# Patient Record
(demographics unavailable — no encounter records)

---

## 2025-04-02 NOTE — HISTORY OF PRESENT ILLNESS
[No Personal or Family History of Easy Bruising, Bleeding, or Issues with General Anesthesia] : No Personal or Family History of easy bruising, bleeding, or issues with general anesthesia [de-identified] : 8 y/o M here for follow up visit for cough and facial swelling. History of cough and facial swelling x 2 years. States no fluctuation in swelling from on each cheek currently. Denies nasal congestion Patient denies dysphagia, odynophagia, throat pain, dyspnea, dysphonia, pain while speaking, sob when speaking, hemoptysis, neck swelling or throat infections Intermittent snoring + mouth breathing; heavy breathing Restless sleeper with occasional waking at night Denies enuresis Daytime fatigue. MR orbit, face and/or neck 3/26/2025- enlarged parotid glands diffusely enlarged and demonstrate heterogeneous enhancement and internal microcystic changes. No surrounding edema is noted in the adjacent soft tissues. There is no evidence for abscess formation.  No trial of Flonase or nasal sprays

## 2025-04-02 NOTE — REASON FOR VISIT
[Parents] : parents [Mother] : mother [Subsequent Evaluation] : a subsequent evaluation for [FreeTextEntry2] : cough and facial swelling

## 2025-04-02 NOTE — REVIEW OF SYSTEMS
[Negative] : Heme/Lymph [de-identified] : as per HPI  [de-identified] : as per HPI  [de-identified] : as per HPI

## 2025-04-02 NOTE — CONSULT LETTER
[Dear  ___] : Dear  [unfilled], [Consult Letter:] : I had the pleasure of evaluating your patient, [unfilled]. [Please see my note below.] : Please see my note below. [Consult Closing:] : Thank you very much for allowing me to participate in the care of this patient.  If you have any questions, please do not hesitate to contact me. [Sincerely,] : Sincerely, [FreeTextEntry2] : Stoney Leon MD [FreeTextEntry3] : Cameron Black MD Pediatric Otolaryngology [address] [address] Tel [phone] Fax [phone]

## 2025-04-02 NOTE — DATA REVIEWED
[FreeTextEntry1] : I  reviewed the images and report of the MRI independently and discussed the results with the patient and his mother. 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